# Patient Record
Sex: MALE | Race: OTHER | HISPANIC OR LATINO
[De-identification: names, ages, dates, MRNs, and addresses within clinical notes are randomized per-mention and may not be internally consistent; named-entity substitution may affect disease eponyms.]

---

## 2017-12-08 PROBLEM — Z00.00 ENCOUNTER FOR PREVENTIVE HEALTH EXAMINATION: Status: ACTIVE | Noted: 2017-12-08

## 2017-12-21 ENCOUNTER — APPOINTMENT (OUTPATIENT)
Dept: OPHTHALMOLOGY | Facility: CLINIC | Age: 52
End: 2017-12-21
Payer: MEDICAID

## 2017-12-21 PROCEDURE — 92225: CPT | Mod: LT

## 2017-12-21 PROCEDURE — 92014 COMPRE OPH EXAM EST PT 1/>: CPT

## 2019-01-17 ENCOUNTER — APPOINTMENT (OUTPATIENT)
Dept: OPHTHALMOLOGY | Facility: CLINIC | Age: 54
End: 2019-01-17
Payer: MEDICAID

## 2019-01-17 PROCEDURE — 92014 COMPRE OPH EXAM EST PT 1/>: CPT

## 2019-01-17 PROCEDURE — 92250 FUNDUS PHOTOGRAPHY W/I&R: CPT

## 2019-01-17 PROCEDURE — 92015 DETERMINE REFRACTIVE STATE: CPT

## 2019-03-13 ENCOUNTER — EMERGENCY (EMERGENCY)
Facility: HOSPITAL | Age: 54
LOS: 1 days | Discharge: HOME | End: 2019-03-13
Attending: EMERGENCY MEDICINE | Admitting: EMERGENCY MEDICINE
Payer: MEDICAID

## 2019-03-13 VITALS
RESPIRATION RATE: 20 BRPM | SYSTOLIC BLOOD PRESSURE: 120 MMHG | OXYGEN SATURATION: 98 % | DIASTOLIC BLOOD PRESSURE: 74 MMHG | HEART RATE: 98 BPM

## 2019-03-13 VITALS
SYSTOLIC BLOOD PRESSURE: 149 MMHG | DIASTOLIC BLOOD PRESSURE: 85 MMHG | TEMPERATURE: 99 F | HEART RATE: 113 BPM | RESPIRATION RATE: 20 BRPM | OXYGEN SATURATION: 99 %

## 2019-03-13 LAB
ALBUMIN SERPL ELPH-MCNC: 4.3 G/DL — SIGNIFICANT CHANGE UP (ref 3.3–5)
ALP SERPL-CCNC: 73 U/L — SIGNIFICANT CHANGE UP (ref 40–120)
ALT FLD-CCNC: 32 U/L — SIGNIFICANT CHANGE UP (ref 4–41)
ANION GAP SERPL CALC-SCNC: 15 MMO/L — HIGH (ref 7–14)
APPEARANCE UR: CLEAR — SIGNIFICANT CHANGE UP
AST SERPL-CCNC: 27 U/L — SIGNIFICANT CHANGE UP (ref 4–40)
B-OH-BUTYR SERPL-SCNC: < 0 MMOL/L — LOW (ref 0–0.4)
BACTERIA # UR AUTO: NEGATIVE — SIGNIFICANT CHANGE UP
BASE EXCESS BLDV CALC-SCNC: 0.9 MMOL/L — SIGNIFICANT CHANGE UP
BASE EXCESS BLDV CALC-SCNC: 3.5 MMOL/L — SIGNIFICANT CHANGE UP
BASOPHILS # BLD AUTO: 0.01 K/UL — SIGNIFICANT CHANGE UP (ref 0–0.2)
BASOPHILS NFR BLD AUTO: 0.1 % — SIGNIFICANT CHANGE UP (ref 0–2)
BILIRUB SERPL-MCNC: 0.6 MG/DL — SIGNIFICANT CHANGE UP (ref 0.2–1.2)
BILIRUB UR-MCNC: NEGATIVE — SIGNIFICANT CHANGE UP
BLOOD GAS VENOUS - CREATININE: 0.41 MG/DL — LOW (ref 0.5–1.3)
BLOOD GAS VENOUS - CREATININE: 0.56 MG/DL — SIGNIFICANT CHANGE UP (ref 0.5–1.3)
BLOOD UR QL VISUAL: NEGATIVE — SIGNIFICANT CHANGE UP
BUN SERPL-MCNC: 15 MG/DL — SIGNIFICANT CHANGE UP (ref 7–23)
CALCIUM SERPL-MCNC: 9.5 MG/DL — SIGNIFICANT CHANGE UP (ref 8.4–10.5)
CHLORIDE BLDV-SCNC: 105 MMOL/L — SIGNIFICANT CHANGE UP (ref 96–108)
CHLORIDE BLDV-SCNC: 108 MMOL/L — SIGNIFICANT CHANGE UP (ref 96–108)
CHLORIDE SERPL-SCNC: 98 MMOL/L — SIGNIFICANT CHANGE UP (ref 98–107)
CO2 SERPL-SCNC: 25 MMOL/L — SIGNIFICANT CHANGE UP (ref 22–31)
COLOR SPEC: SIGNIFICANT CHANGE UP
CREAT SERPL-MCNC: 0.63 MG/DL — SIGNIFICANT CHANGE UP (ref 0.5–1.3)
EOSINOPHIL # BLD AUTO: 0.07 K/UL — SIGNIFICANT CHANGE UP (ref 0–0.5)
EOSINOPHIL NFR BLD AUTO: 0.7 % — SIGNIFICANT CHANGE UP (ref 0–6)
GAS PNL BLDV: 133 MMOL/L — LOW (ref 136–146)
GAS PNL BLDV: 133 MMOL/L — LOW (ref 136–146)
GLUCOSE BLDV-MCNC: 256 — HIGH (ref 70–99)
GLUCOSE BLDV-MCNC: 297 — HIGH (ref 70–99)
GLUCOSE SERPL-MCNC: 298 MG/DL — HIGH (ref 70–99)
GLUCOSE UR-MCNC: >1000 — HIGH
HCO3 BLDV-SCNC: 25 MMOL/L — SIGNIFICANT CHANGE UP (ref 20–27)
HCO3 BLDV-SCNC: 27 MMOL/L — SIGNIFICANT CHANGE UP (ref 20–27)
HCT VFR BLD CALC: 39.7 % — SIGNIFICANT CHANGE UP (ref 39–50)
HCT VFR BLDV CALC: 38.7 % — LOW (ref 39–51)
HCT VFR BLDV CALC: 43.6 % — SIGNIFICANT CHANGE UP (ref 39–51)
HGB BLD-MCNC: 13.6 G/DL — SIGNIFICANT CHANGE UP (ref 13–17)
HGB BLDV-MCNC: 12.6 G/DL — LOW (ref 13–17)
HGB BLDV-MCNC: 14.2 G/DL — SIGNIFICANT CHANGE UP (ref 13–17)
HYALINE CASTS # UR AUTO: NEGATIVE — SIGNIFICANT CHANGE UP
IMM GRANULOCYTES NFR BLD AUTO: 0.5 % — SIGNIFICANT CHANGE UP (ref 0–1.5)
KETONES UR-MCNC: NEGATIVE — SIGNIFICANT CHANGE UP
LACTATE BLDV-MCNC: 1.9 MMOL/L — SIGNIFICANT CHANGE UP (ref 0.5–2)
LACTATE BLDV-MCNC: 2.1 MMOL/L — HIGH (ref 0.5–2)
LEUKOCYTE ESTERASE UR-ACNC: NEGATIVE — SIGNIFICANT CHANGE UP
LIDOCAIN IGE QN: 67.1 U/L — HIGH (ref 7–60)
LYMPHOCYTES # BLD AUTO: 0.8 K/UL — LOW (ref 1–3.3)
LYMPHOCYTES # BLD AUTO: 8.3 % — LOW (ref 13–44)
MCHC RBC-ENTMCNC: 27.9 PG — SIGNIFICANT CHANGE UP (ref 27–34)
MCHC RBC-ENTMCNC: 34.3 % — SIGNIFICANT CHANGE UP (ref 32–36)
MCV RBC AUTO: 81.4 FL — SIGNIFICANT CHANGE UP (ref 80–100)
MONOCYTES # BLD AUTO: 0.38 K/UL — SIGNIFICANT CHANGE UP (ref 0–0.9)
MONOCYTES NFR BLD AUTO: 4 % — SIGNIFICANT CHANGE UP (ref 2–14)
NEUTROPHILS # BLD AUTO: 8.28 K/UL — HIGH (ref 1.8–7.4)
NEUTROPHILS NFR BLD AUTO: 86.4 % — HIGH (ref 43–77)
NITRITE UR-MCNC: NEGATIVE — SIGNIFICANT CHANGE UP
NRBC # FLD: 0 K/UL — LOW (ref 25–125)
PCO2 BLDV: 43 MMHG — SIGNIFICANT CHANGE UP (ref 41–51)
PCO2 BLDV: 48 MMHG — SIGNIFICANT CHANGE UP (ref 41–51)
PH BLDV: 7.39 PH — SIGNIFICANT CHANGE UP (ref 7.32–7.43)
PH BLDV: 7.39 PH — SIGNIFICANT CHANGE UP (ref 7.32–7.43)
PH UR: 7 — SIGNIFICANT CHANGE UP (ref 5–8)
PLATELET # BLD AUTO: 215 K/UL — SIGNIFICANT CHANGE UP (ref 150–400)
PMV BLD: 10 FL — SIGNIFICANT CHANGE UP (ref 7–13)
PO2 BLDV: 56 MMHG — HIGH (ref 35–40)
PO2 BLDV: 58 MMHG — HIGH (ref 35–40)
POTASSIUM BLDV-SCNC: 3.6 MMOL/L — SIGNIFICANT CHANGE UP (ref 3.4–4.5)
POTASSIUM BLDV-SCNC: 3.6 MMOL/L — SIGNIFICANT CHANGE UP (ref 3.4–4.5)
POTASSIUM SERPL-MCNC: 3.8 MMOL/L — SIGNIFICANT CHANGE UP (ref 3.5–5.3)
POTASSIUM SERPL-SCNC: 3.8 MMOL/L — SIGNIFICANT CHANGE UP (ref 3.5–5.3)
PROT SERPL-MCNC: 7.4 G/DL — SIGNIFICANT CHANGE UP (ref 6–8.3)
PROT UR-MCNC: 50 — SIGNIFICANT CHANGE UP
RBC # BLD: 4.88 M/UL — SIGNIFICANT CHANGE UP (ref 4.2–5.8)
RBC # FLD: 12.6 % — SIGNIFICANT CHANGE UP (ref 10.3–14.5)
RBC CASTS # UR COMP ASSIST: SIGNIFICANT CHANGE UP (ref 0–?)
SAO2 % BLDV: 87.6 % — HIGH (ref 60–85)
SAO2 % BLDV: 89.3 % — HIGH (ref 60–85)
SODIUM SERPL-SCNC: 138 MMOL/L — SIGNIFICANT CHANGE UP (ref 135–145)
SP GR SPEC: 1.03 — SIGNIFICANT CHANGE UP (ref 1–1.04)
SQUAMOUS # UR AUTO: SIGNIFICANT CHANGE UP
UROBILINOGEN FLD QL: NORMAL — SIGNIFICANT CHANGE UP
WBC # BLD: 9.59 K/UL — SIGNIFICANT CHANGE UP (ref 3.8–10.5)
WBC # FLD AUTO: 9.59 K/UL — SIGNIFICANT CHANGE UP (ref 3.8–10.5)
WBC UR QL: SIGNIFICANT CHANGE UP (ref 0–?)

## 2019-03-13 PROCEDURE — 99284 EMERGENCY DEPT VISIT MOD MDM: CPT | Mod: 25

## 2019-03-13 PROCEDURE — 74177 CT ABD & PELVIS W/CONTRAST: CPT | Mod: 26

## 2019-03-13 RX ORDER — ONDANSETRON 8 MG/1
1 TABLET, FILM COATED ORAL
Qty: 12 | Refills: 0 | OUTPATIENT
Start: 2019-03-13 | End: 2019-03-16

## 2019-03-13 RX ORDER — METOCLOPRAMIDE HCL 10 MG
10 TABLET ORAL ONCE
Qty: 0 | Refills: 0 | Status: COMPLETED | OUTPATIENT
Start: 2019-03-13 | End: 2019-03-13

## 2019-03-13 RX ORDER — ACETAMINOPHEN 500 MG
1000 TABLET ORAL ONCE
Qty: 0 | Refills: 0 | Status: COMPLETED | OUTPATIENT
Start: 2019-03-13 | End: 2019-03-13

## 2019-03-13 RX ORDER — SODIUM CHLORIDE 9 MG/ML
1000 INJECTION INTRAMUSCULAR; INTRAVENOUS; SUBCUTANEOUS ONCE
Qty: 0 | Refills: 0 | Status: COMPLETED | OUTPATIENT
Start: 2019-03-13 | End: 2019-03-13

## 2019-03-13 RX ORDER — FAMOTIDINE 10 MG/ML
20 INJECTION INTRAVENOUS ONCE
Qty: 0 | Refills: 0 | Status: COMPLETED | OUTPATIENT
Start: 2019-03-13 | End: 2019-03-13

## 2019-03-13 RX ORDER — ONDANSETRON 8 MG/1
4 TABLET, FILM COATED ORAL ONCE
Qty: 0 | Refills: 0 | Status: COMPLETED | OUTPATIENT
Start: 2019-03-13 | End: 2019-03-13

## 2019-03-13 RX ADMIN — FAMOTIDINE 20 MILLIGRAM(S): 10 INJECTION INTRAVENOUS at 05:07

## 2019-03-13 RX ADMIN — ONDANSETRON 4 MILLIGRAM(S): 8 TABLET, FILM COATED ORAL at 05:07

## 2019-03-13 RX ADMIN — SODIUM CHLORIDE 1000 MILLILITER(S): 9 INJECTION INTRAMUSCULAR; INTRAVENOUS; SUBCUTANEOUS at 05:08

## 2019-03-13 RX ADMIN — Medication 10 MILLIGRAM(S): at 09:28

## 2019-03-13 RX ADMIN — Medication 30 MILLILITER(S): at 05:07

## 2019-03-13 RX ADMIN — Medication 1000 MILLIGRAM(S): at 09:28

## 2019-03-13 RX ADMIN — Medication 400 MILLIGRAM(S): at 05:07

## 2019-03-13 RX ADMIN — SODIUM CHLORIDE 1000 MILLILITER(S): 9 INJECTION INTRAMUSCULAR; INTRAVENOUS; SUBCUTANEOUS at 05:48

## 2019-03-13 NOTE — ED PROVIDER NOTE - NSFOLLOWUPINSTRUCTIONS_ED_ALL_ED_FT
take zofran as prescribed for nausea.  stay well hydrated.  Follow up with primary doctor within7 days.  Seek medical attention if symptoms worsen or if you cannot drink or eat.

## 2019-03-13 NOTE — ED PROVIDER NOTE - OBJECTIVE STATEMENT
53yo M w/ pmhx of Dm, HLD, HTN presents with worsening non-bloody diarrhea for about 2 weeks and 1 day of NB/NB vomiting. Pt reports 8/10, diffuse abdominal burning pain, similar to his GERD symptoms. Denies any fever or chills, no recent abx use, no recent travel . Denies any other symptoms. PT was vomiting in the bathroom today, while on the ground, slump over the hit his head but no LOC, no blurry vision, nausea, vomiting or any other symptoms. 53yo M w/ pmhx of DM (weekly Trulicity injections due Thursday), HLD, HTN presents with worsening non-bloody diarrhea for about 2 weeks and 1 day of NB/NB vomiting. Pt reports 8/10, diffuse abdominal burning pain, similar to his GERD symptoms. Denies any fever or chills, no recent abx use, no recent travel . Denies any other symptoms. PT was vomiting in the bathroom today, while on the ground, slump over the hit his head but no LOC, no blurry vision, nausea, vomiting or any other symptoms.

## 2019-03-13 NOTE — ED PROVIDER NOTE - CLINICAL SUMMARY MEDICAL DECISION MAKING FREE TEXT BOX
55yo M w/ pmhx of Dm, HLD, HTN presents with worsening non-bloody diarrhea for about 2 weeks and 1 day of NB/NB vomiting. Pt reports 8/10, diffuse abdominal burning pain, similar to his GERD symptoms. Denies any fever or chills, no recent abx use, no recent travel . Denies any other symptoms. PT was vomiting in the bathroom today, while on the ground, slump over the hit his head but no LOC, no blurry vision, nausea, vomiting or any other symptoms.   - Labs, meds, CT

## 2019-03-13 NOTE — ED ADULT NURSE REASSESSMENT NOTE - NS ED NURSE REASSESS COMMENT FT1
pt remains a&ox4. pt has not had v/d since reglan administration. pt was just given crackers,water for PO challenge

## 2019-03-13 NOTE — ED ADULT NURSE NOTE - NSIMPLEMENTINTERV_GEN_ALL_ED
Implemented All Universal Safety Interventions:  [place] to call system. Call bell, personal items and telephone within reach. Instruct patient to call for assistance. Room bathroom lighting operational. Non-slip footwear when patient is off stretcher. Physically safe environment: no spills, clutter or unnecessary equipment. Stretcher in lowest position, wheels locked, appropriate side rails in place.

## 2019-03-13 NOTE — ED ADULT TRIAGE NOTE - CHIEF COMPLAINT QUOTE
pt c/o abdominal pain and diarrhea x 2 weeks worsening today. began vomitting today. states he was weak and fell in the shower, hitting his head, denies loc. no blood thinner use known. abdomen tender to palpation. no obvious s.s of injury

## 2019-03-13 NOTE — ED ADULT NURSE NOTE - OBJECTIVE STATEMENT
received pt to room 2. pt is alert and oriented x4, ambulatory. c/o abdominal pain for two weeks but worse today, nausea and vomiting. vomited several times. labs sent. 20 G SL placed in left AC. IV fluids and meds given as per order. vs WNL received pt to room 2. pt is alert and oriented x4, ambulatory. c/o abdominal pain for two weeks but worse today, nausea and vomiting and diarrhea . vomited several times. labs sent. 20 G SL placed in left AC. IV fluids and meds given as per order. vs WNL

## 2019-03-13 NOTE — ED ADULT NURSE REASSESSMENT NOTE - NS ED NURSE REASSESS COMMENT FT1
assumed care of pt from AGUSTIN Gleason. Pt a&ox4 and ambulatory. pt c/o generalized abdominal pain 6/10. BS present, no increased pain upon palpation. lung sounds clear. Pt currently experiencing n/v/d. VS as noted. repeat lactate normal.  call bell at bedside.

## 2019-03-13 NOTE — ED PROVIDER NOTE - ATTENDING CONTRIBUTION TO CARE
Afebrile. Awake and Alert. Lungs CTA. Heart RRR. Abdomen soft NTND. CN II-XII grossly intact. Moves all extremities without lateralization.    NB diarrhea x3 weeks: Check electroyltes, serial abdominal exams, IVF Afebrile. Awake and Alert. Lungs CTA. Heart RRR. Abdomen soft NTND. CN II-XII grossly intact. Moves all extremities without lateralization.    NB diarrhea x3 weeks: r/o colitis, check electrolytes, IVF, anti-emetic  r/o DKA: labs

## 2019-03-13 NOTE — ED PROVIDER NOTE - PROGRESS NOTE DETAILS
No acute process on CT. Pt asleep. Will repeat VBG and PO challenge. NAOMI Examined patient with family at bedside.  Abdomen is soft, non distended, non tender, no rebound.  He appears well hydrated. he reports vomiting x 1 about 10 minutes ago.  reglan ordered. Patient feeling better with no further episodes of vomiting.  tolerating PO with no abdominal tenderness

## 2020-10-21 ENCOUNTER — EMERGENCY (EMERGENCY)
Facility: HOSPITAL | Age: 55
LOS: 1 days | Discharge: HOME | End: 2020-10-21
Attending: EMERGENCY MEDICINE | Admitting: EMERGENCY MEDICINE
Payer: MEDICAID

## 2020-10-21 VITALS
HEART RATE: 85 BPM | DIASTOLIC BLOOD PRESSURE: 88 MMHG | RESPIRATION RATE: 18 BRPM | OXYGEN SATURATION: 100 % | SYSTOLIC BLOOD PRESSURE: 146 MMHG

## 2020-10-21 VITALS
OXYGEN SATURATION: 99 % | DIASTOLIC BLOOD PRESSURE: 89 MMHG | RESPIRATION RATE: 18 BRPM | HEART RATE: 103 BPM | SYSTOLIC BLOOD PRESSURE: 155 MMHG | TEMPERATURE: 98 F

## 2020-10-21 PROCEDURE — 99283 EMERGENCY DEPT VISIT LOW MDM: CPT

## 2020-10-21 RX ORDER — DIAZEPAM 5 MG
5 TABLET ORAL ONCE
Refills: 0 | Status: DISCONTINUED | OUTPATIENT
Start: 2020-10-21 | End: 2020-10-21

## 2020-10-21 RX ORDER — LIDOCAINE 4 G/100G
1 CREAM TOPICAL ONCE
Refills: 0 | Status: COMPLETED | OUTPATIENT
Start: 2020-10-21 | End: 2020-10-21

## 2020-10-21 RX ORDER — KETOROLAC TROMETHAMINE 30 MG/ML
30 SYRINGE (ML) INJECTION ONCE
Refills: 0 | Status: DISCONTINUED | OUTPATIENT
Start: 2020-10-21 | End: 2020-10-21

## 2020-10-21 RX ORDER — LIDOCAINE 4 G/100G
1 CREAM TOPICAL
Qty: 12 | Refills: 0
Start: 2020-10-21

## 2020-10-21 RX ORDER — DIAZEPAM 5 MG
1 TABLET ORAL
Qty: 15 | Refills: 0
Start: 2020-10-21

## 2020-10-21 RX ORDER — OXYCODONE AND ACETAMINOPHEN 5; 325 MG/1; MG/1
1 TABLET ORAL ONCE
Refills: 0 | Status: DISCONTINUED | OUTPATIENT
Start: 2020-10-21 | End: 2020-10-21

## 2020-10-21 RX ORDER — IBUPROFEN 200 MG
1 TABLET ORAL
Qty: 20 | Refills: 0
Start: 2020-10-21

## 2020-10-21 RX ADMIN — OXYCODONE AND ACETAMINOPHEN 1 TABLET(S): 5; 325 TABLET ORAL at 22:16

## 2020-10-21 RX ADMIN — LIDOCAINE 1 PATCH: 4 CREAM TOPICAL at 20:56

## 2020-10-21 RX ADMIN — Medication 30 MILLIGRAM(S): at 20:56

## 2020-10-21 RX ADMIN — Medication 30 MILLIGRAM(S): at 21:12

## 2020-10-21 RX ADMIN — Medication 5 MILLIGRAM(S): at 20:56

## 2020-10-21 NOTE — ED ADULT NURSE NOTE - OBJECTIVE STATEMENT
56y/o male aaox4 and ambulatory at baseline c/o back pain. Pt states pain started yesterday while laying in bed. Pt states pain radiates to b/c legs; denies numbness/tingling. Pt denies injury, chest pain, SOB, dizziness, N+V. Pt states pain worsens with movement and activity. Vital signs as noted. PT medicated as per MD order. Pt repositioned for comfort. Will continue to monitor.

## 2020-10-21 NOTE — ED PROVIDER NOTE - OBJECTIVE STATEMENT
Patient is a 54 y/o M with hx of DM presenting with lower back pain x 1 day. pain is sharp in nature located to the lumbosacral region that radiates down the legs. he denies recent injury/trauma/strenous activity prior to the onset of pain. There is no associated urinary/fecal incontinence, numbness, weakness, saddle anesthesia, loss of lower extremity function.

## 2020-10-21 NOTE — ED PROVIDER NOTE - MUSCULOSKELETAL, MLM
+ tenderness to the lumbosacral region. no midline spinal tenderess, no obvious signs of trauma or rash. good lower extremity strength, (-) straight leg raise. no numbness, weakness, no saddle anesthesia.

## 2020-10-21 NOTE — ED PROVIDER NOTE - ATTENDING CONTRIBUTION TO CARE
Attending note:   After face to face evaluation of this patient, I concur with above noted hx, pe, and care plan for this patient.  [place]: 55 yom with DM complaining of one day of lower back pain very sharp and radiating down both legs. Pt denies trauma, numbness, tingling, difficulty with bowel or bladder function. on exam, pt is well appearing, ambulating well without difficulty, no midline spinal tn, significant paraspinal tenderness in the lower lumbar areas bilaterally left more than right side. negative straight leg raise bilaterally, no saddle anesthesia, abd soft and non tender, clear lungs and RRR. Pt's pain appears to be radicular in nature and MSK related, no  or GI. Will treat pain with NSAIDs, lido patch, muscle relaxants and reassess.

## 2020-10-21 NOTE — ED PROVIDER NOTE - CLINICAL SUMMARY MEDICAL DECISION MAKING FREE TEXT BOX
patient presenting with lower back pain x 1 day. no neurological deficits. plan for trial of toradol, valium and lidocaine patch and reassessment

## 2020-10-21 NOTE — ED ADULT TRIAGE NOTE - CHIEF COMPLAINT QUOTE
Patient c/o lower back pain that goes to bilateral legs, pain started yesterday. Patient appears uncomfortable in triage.

## 2020-10-21 NOTE — ED PROVIDER NOTE - PATIENT PORTAL LINK FT
You can access the FollowMyHealth Patient Portal offered by [facility] by registering at the following website: [email or web address]. By joining [employer]’s FollowMyHealth portal, you will also be able to view your health information using other applications (apps) compatible with our system.

## 2020-10-22 PROBLEM — E11.9 TYPE 2 DIABETES MELLITUS WITHOUT COMPLICATIONS: Chronic | Status: ACTIVE | Noted: 2019-03-13

## 2020-10-22 PROBLEM — I10 ESSENTIAL (PRIMARY) HYPERTENSION: Chronic | Status: ACTIVE | Noted: 2019-03-13

## 2021-01-31 ENCOUNTER — EMERGENCY (EMERGENCY)
Facility: HOSPITAL | Age: 56
LOS: 1 days | Discharge: HOME | End: 2021-01-31
Attending: EMERGENCY MEDICINE | Admitting: EMERGENCY MEDICINE
Payer: MEDICAID

## 2021-01-31 VITALS
SYSTOLIC BLOOD PRESSURE: 146 MMHG | HEART RATE: 95 BPM | DIASTOLIC BLOOD PRESSURE: 97 MMHG | RESPIRATION RATE: 18 BRPM | TEMPERATURE: 98 F | OXYGEN SATURATION: 99 %

## 2021-01-31 VITALS
OXYGEN SATURATION: 100 % | RESPIRATION RATE: 18 BRPM | SYSTOLIC BLOOD PRESSURE: 115 MMHG | DIASTOLIC BLOOD PRESSURE: 78 MMHG | HEART RATE: 78 BPM

## 2021-01-31 LAB
ALBUMIN SERPL ELPH-MCNC: 4 G/DL — SIGNIFICANT CHANGE UP (ref 3.3–5)
ALP SERPL-CCNC: 95 U/L — SIGNIFICANT CHANGE UP (ref 40–120)
ALT FLD-CCNC: 17 U/L — SIGNIFICANT CHANGE UP (ref 4–41)
ANION GAP SERPL CALC-SCNC: 11 MMOL/L — SIGNIFICANT CHANGE UP (ref 7–14)
AST SERPL-CCNC: 16 U/L — SIGNIFICANT CHANGE UP (ref 4–40)
BASOPHILS # BLD AUTO: 0.02 K/UL — SIGNIFICANT CHANGE UP (ref 0–0.2)
BASOPHILS NFR BLD AUTO: 0.3 % — SIGNIFICANT CHANGE UP (ref 0–2)
BILIRUB SERPL-MCNC: 0.7 MG/DL — SIGNIFICANT CHANGE UP (ref 0.2–1.2)
BUN SERPL-MCNC: 11 MG/DL — SIGNIFICANT CHANGE UP (ref 7–23)
CALCIUM SERPL-MCNC: 9.6 MG/DL — SIGNIFICANT CHANGE UP (ref 8.4–10.5)
CHLORIDE SERPL-SCNC: 100 MMOL/L — SIGNIFICANT CHANGE UP (ref 98–107)
CO2 SERPL-SCNC: 24 MMOL/L — SIGNIFICANT CHANGE UP (ref 22–31)
CREAT SERPL-MCNC: 0.49 MG/DL — LOW (ref 0.5–1.3)
EOSINOPHIL # BLD AUTO: 0.08 K/UL — SIGNIFICANT CHANGE UP (ref 0–0.5)
EOSINOPHIL NFR BLD AUTO: 1.3 % — SIGNIFICANT CHANGE UP (ref 0–6)
GLUCOSE SERPL-MCNC: 295 MG/DL — HIGH (ref 70–99)
HCT VFR BLD CALC: 45 % — SIGNIFICANT CHANGE UP (ref 39–50)
HGB BLD-MCNC: 14.9 G/DL — SIGNIFICANT CHANGE UP (ref 13–17)
IANC: 4.62 K/UL — SIGNIFICANT CHANGE UP (ref 1.5–8.5)
IMM GRANULOCYTES NFR BLD AUTO: 0.6 % — SIGNIFICANT CHANGE UP (ref 0–1.5)
LYMPHOCYTES # BLD AUTO: 1.23 K/UL — SIGNIFICANT CHANGE UP (ref 1–3.3)
LYMPHOCYTES # BLD AUTO: 19.7 % — SIGNIFICANT CHANGE UP (ref 13–44)
MCHC RBC-ENTMCNC: 26.9 PG — LOW (ref 27–34)
MCHC RBC-ENTMCNC: 33.1 GM/DL — SIGNIFICANT CHANGE UP (ref 32–36)
MCV RBC AUTO: 81.2 FL — SIGNIFICANT CHANGE UP (ref 80–100)
MONOCYTES # BLD AUTO: 0.25 K/UL — SIGNIFICANT CHANGE UP (ref 0–0.9)
MONOCYTES NFR BLD AUTO: 4 % — SIGNIFICANT CHANGE UP (ref 2–14)
NEUTROPHILS # BLD AUTO: 4.62 K/UL — SIGNIFICANT CHANGE UP (ref 1.8–7.4)
NEUTROPHILS NFR BLD AUTO: 74.1 % — SIGNIFICANT CHANGE UP (ref 43–77)
NRBC # BLD: 0 /100 WBCS — SIGNIFICANT CHANGE UP
NRBC # FLD: 0 K/UL — SIGNIFICANT CHANGE UP
PLATELET # BLD AUTO: 190 K/UL — SIGNIFICANT CHANGE UP (ref 150–400)
POTASSIUM SERPL-MCNC: 4.2 MMOL/L — SIGNIFICANT CHANGE UP (ref 3.5–5.3)
POTASSIUM SERPL-SCNC: 4.2 MMOL/L — SIGNIFICANT CHANGE UP (ref 3.5–5.3)
PROT SERPL-MCNC: 7.6 G/DL — SIGNIFICANT CHANGE UP (ref 6–8.3)
RBC # BLD: 5.54 M/UL — SIGNIFICANT CHANGE UP (ref 4.2–5.8)
RBC # FLD: 12.8 % — SIGNIFICANT CHANGE UP (ref 10.3–14.5)
SARS-COV-2 RNA SPEC QL NAA+PROBE: SIGNIFICANT CHANGE UP
SODIUM SERPL-SCNC: 135 MMOL/L — SIGNIFICANT CHANGE UP (ref 135–145)
TROPONIN T, HIGH SENSITIVITY RESULT: 7 NG/L — SIGNIFICANT CHANGE UP
TSH SERPL-MCNC: 1.38 UIU/ML — SIGNIFICANT CHANGE UP (ref 0.27–4.2)
WBC # BLD: 6.24 K/UL — SIGNIFICANT CHANGE UP (ref 3.8–10.5)
WBC # FLD AUTO: 6.24 K/UL — SIGNIFICANT CHANGE UP (ref 3.8–10.5)

## 2021-01-31 PROCEDURE — 0042T: CPT

## 2021-01-31 PROCEDURE — 99284 EMERGENCY DEPT VISIT MOD MDM: CPT

## 2021-01-31 PROCEDURE — 70450 CT HEAD/BRAIN W/O DYE: CPT | Mod: 26,59

## 2021-01-31 PROCEDURE — 71045 X-RAY EXAM CHEST 1 VIEW: CPT | Mod: 26

## 2021-01-31 PROCEDURE — 70496 CT ANGIOGRAPHY HEAD: CPT | Mod: 26

## 2021-01-31 PROCEDURE — 72125 CT NECK SPINE W/O DYE: CPT | Mod: 26

## 2021-01-31 PROCEDURE — 70498 CT ANGIOGRAPHY NECK: CPT | Mod: 26

## 2021-01-31 RX ORDER — MECLIZINE HCL 12.5 MG
1 TABLET ORAL
Qty: 15 | Refills: 0
Start: 2021-01-31 | End: 2021-02-04

## 2021-01-31 RX ORDER — MECLIZINE HCL 12.5 MG
25 TABLET ORAL ONCE
Refills: 0 | Status: COMPLETED | OUTPATIENT
Start: 2021-01-31 | End: 2021-01-31

## 2021-01-31 RX ORDER — SODIUM CHLORIDE 9 MG/ML
1000 INJECTION INTRAMUSCULAR; INTRAVENOUS; SUBCUTANEOUS ONCE
Refills: 0 | Status: COMPLETED | OUTPATIENT
Start: 2021-01-31 | End: 2021-01-31

## 2021-01-31 RX ORDER — ACETAMINOPHEN 500 MG
975 TABLET ORAL ONCE
Refills: 0 | Status: COMPLETED | OUTPATIENT
Start: 2021-01-31 | End: 2021-01-31

## 2021-01-31 RX ADMIN — Medication 25 MILLIGRAM(S): at 11:44

## 2021-01-31 RX ADMIN — Medication 975 MILLIGRAM(S): at 11:44

## 2021-01-31 RX ADMIN — SODIUM CHLORIDE 1000 MILLILITER(S): 9 INJECTION INTRAMUSCULAR; INTRAVENOUS; SUBCUTANEOUS at 11:44

## 2021-01-31 NOTE — ED PROVIDER NOTE - ATTENDING CONTRIBUTION TO CARE
DR. BLOCH, ATTENDING MD-  I performed a face to face bedside interview with patient regarding history of present illness, review of symptoms and past medical history. I completed an independent physical exam.  I have discussed patient's plan of care with the resident.  patient alert and oriented, HEENT nml heart s1s2, lungs clear, abd soft nontender, motor 5/5dtr 3+ and equal, no clonus, sensation intact. pos nystagmus, both directions, no past pointing. gait abn, falls to left, pos rhomberg

## 2021-01-31 NOTE — CONSULT NOTE ADULT - SUBJECTIVE AND OBJECTIVE BOX
Neurology  Consult Note  01-31-21    Name:  JESSICA PEÑALOZA; 55y (1965)      Neurology consult: 56yo Albanian-speaking man with PMH of DM who presented to the ED with dizziness and syncope since this morning. LKN was 0830h. Patient reports he was having an argument with his wife and then does not recall the events well began he had LOC for possibly 1-3 minutes. As per EMR, family noticed he was beginning to fall and grabbed him preventing head trauma. Patient reports generalized weakness, pain around his eyes, and room-spinning sensation. Patient denies focal weakness, numbness, nausea, vomiting, acute changes in vision or hearing, recent falls or trauma. In the ED code stroke was activated. NIHSS of 0. Pre-MRS of 0. CTH shows no acute pathology. CTA shows no LVO but notes RUL groundglass opacities. CTP shows no core infarct or penumbra. Patient was not a candidate for tPA due to low NIHSS score and was not a candidate for thrombectomy due to no LVO.    Review of Systems:  As states in HPI.        PMHx:   HTN (hypertension)    DM (diabetes mellitus)      PFHx:   No pertinent family history in first degree relatives    No pertinent family history in first degree relatives      PSuHx:   No significant past surgical history        Vitals:  T(C): 36.7 (01-31-21 @ 10:15), Max: 36.7 (01-31-21 @ 10:15)  HR: 90 (01-31-21 @ 11:12) (90 - 95)  BP: 140/93 (01-31-21 @ 11:12) (140/93 - 146/97)  RR: 18 (01-31-21 @ 11:12) (18 - 18)  SpO2: 100% (01-31-21 @ 11:12) (99% - 100%)    Physical Examination:  Neurologic:  - Mental Status: Alert, awake, oriented to person, place, and time; Speech is fluent with intact naming, repetition, and comprehension, no dysarthria; Follows all commands.  - Cranial Nerves:  II: Visual fields are full to confrontation; Pupils are equal, round, and reactive to light;  III, IV, VI: Extraocular movements are intact with R beating nystagmus. HINTS exam unremarkable.  V: Facial sensation is intact in the V1-V3 distribution bilaterally.  VII: Face is symmetric with normal eye closure and smile.  VIII: Hearing is intact to finger rub.  IX, X: Uvula is midline and soft palate rises symmetrically.  XI: Head turning and shoulder shrug are intact.  XII: Tongue protrudes in the midline.  - Motor: Strength is 5/5 throughout on full effort, occasionally limited by pain particularly behind the L > R patella. There is no pronator drift.  - Reflexes: 3+ and symmetric at the biceps, triceps, knees.  - Sensory: Intact throughout to light touch  - Coordination: Finger-nose-finger and heel-knee-shin intact without dysmetria.  - Gait: Unsteady gait and turning. Romberg testing is positive however patient falls in no particular direction as he was initially falling to the L but then began falling forward, backward, and to the R.    Labs:                        14.9   6.24  )-----------( 190      ( 31 Jan 2021 11:21 )             45.0     01-31    135  |  100  |  11  ----------------------------<  295<H>  4.2   |  24  |  0.49<L>    Ca    9.6      31 Jan 2021 11:21    TPro  7.6  /  Alb  4.0  /  TBili  0.7  /  DBili  x   /  AST  16  /  ALT  17  /  AlkPhos  95  01-31    CAPILLARY BLOOD GLUCOSE        LIVER FUNCTIONS - ( 31 Jan 2021 11:21 )  Alb: 4.0 g/dL / Pro: 7.6 g/dL / ALK PHOS: 95 U/L / ALT: 17 U/L / AST: 16 U/L / GGT: x                     Radiology:  CT Brain Stroke Protocol (01.31.21 @ 11:26)  IMPRESSION:  No acute intracranial hemorrhage, or CT evidence of acute vascular territorial infarct.    CT Angio Neck w/ IV Cont (01.31.21 @ 11:37)  IMPRESSION:  CT angiography neck: No evidence of hemodynamically significant stenosis by NASCET criteria. No evidence of vascular dissection. Limited evaluation of the lung parenchyma secondary to respiratory motion artifact, however there is partially imaged right upper lobe groundglass opacities.  CT angiography brain: No major vessel occlusion or proximal stenosis by NASCET criteria. No evidence of aneurysm or other vascular malformation.  CT PERFUSION: No perfusion defect.   Neurology  Consult Note  01-31-21    Name:  JESSICA PEÑALOZA; 55y (1965)      Neurology consult: 56yo Wolof-speaking man with PMH of DM who presented to the ED with dizziness and syncope since this morning. LKN was 0830h. Patient reports he was having an argument with his wife when he had LOC for possibly 1-3 minutes. As per EMR, family noticed he was beginning to fall and grabbed him preventing head trauma. Patient reports generalized weakness, pain around his eyes, and room-spinning sensation. Patient denies focal weakness, numbness, nausea, vomiting, acute changes in vision or hearing, recent falls or trauma. In the ED code stroke was activated. NIHSS of 0. Pre-MRS of 0. CTH shows no acute pathology. CTA shows no LVO but notes RUL groundglass opacities. CTP shows no core infarct or penumbra. Patient was not a candidate for tPA due to low NIHSS score and was not a candidate for thrombectomy due to no LVO.    Review of Systems:  As states in HPI.        PMHx:   HTN (hypertension)    DM (diabetes mellitus)      PFHx:   No pertinent family history in first degree relatives    No pertinent family history in first degree relatives      PSuHx:   No significant past surgical history        Vitals:  T(C): 36.7 (01-31-21 @ 10:15), Max: 36.7 (01-31-21 @ 10:15)  HR: 90 (01-31-21 @ 11:12) (90 - 95)  BP: 140/93 (01-31-21 @ 11:12) (140/93 - 146/97)  RR: 18 (01-31-21 @ 11:12) (18 - 18)  SpO2: 100% (01-31-21 @ 11:12) (99% - 100%)    Physical Examination:  Neurologic:  - Mental Status: Alert, awake, oriented to person, place, and time; Speech is fluent with intact naming, repetition, and comprehension, no dysarthria; Follows all commands.  - Cranial Nerves:  II: Visual fields are full to confrontation; Pupils are equal, round, and reactive to light;  III, IV, VI: Extraocular movements are intact with R beating nystagmus. HINTS exam unremarkable.  V: Facial sensation is intact in the V1-V3 distribution bilaterally.  VII: Face is symmetric with normal eye closure and smile.  VIII: Hearing is intact to finger rub.  IX, X: Uvula is midline and soft palate rises symmetrically.  XI: Head turning and shoulder shrug are intact.  XII: Tongue protrudes in the midline.  - Motor: Strength is 5/5 throughout on full effort, occasionally limited by pain particularly behind the L > R patella. There is no pronator drift.  - Reflexes: 3+ and symmetric at the biceps, triceps, knees.  - Sensory: Intact throughout to light touch  - Coordination: Finger-nose-finger and heel-knee-shin intact without dysmetria.  - Gait: Unsteady gait and turning. Romberg testing is positive however patient falls in no particular direction as he was initially falling to the L but then began falling forward, backward, and to the R.    Labs:                        14.9   6.24  )-----------( 190      ( 31 Jan 2021 11:21 )             45.0     01-31    135  |  100  |  11  ----------------------------<  295<H>  4.2   |  24  |  0.49<L>    Ca    9.6      31 Jan 2021 11:21    TPro  7.6  /  Alb  4.0  /  TBili  0.7  /  DBili  x   /  AST  16  /  ALT  17  /  AlkPhos  95  01-31    CAPILLARY BLOOD GLUCOSE        LIVER FUNCTIONS - ( 31 Jan 2021 11:21 )  Alb: 4.0 g/dL / Pro: 7.6 g/dL / ALK PHOS: 95 U/L / ALT: 17 U/L / AST: 16 U/L / GGT: x                     Radiology:  CT Brain Stroke Protocol (01.31.21 @ 11:26)  IMPRESSION:  No acute intracranial hemorrhage, or CT evidence of acute vascular territorial infarct.    CT Angio Neck w/ IV Cont (01.31.21 @ 11:37)  IMPRESSION:  CT angiography neck: No evidence of hemodynamically significant stenosis by NASCET criteria. No evidence of vascular dissection. Limited evaluation of the lung parenchyma secondary to respiratory motion artifact, however there is partially imaged right upper lobe groundglass opacities.  CT angiography brain: No major vessel occlusion or proximal stenosis by NASCET criteria. No evidence of aneurysm or other vascular malformation.  CT PERFUSION: No perfusion defect.

## 2021-01-31 NOTE — ED ADULT TRIAGE NOTE - CHIEF COMPLAINT QUOTE
Patient brought to ER from home by EMS for c/o dizziness after he got into an argument with his family. . Patient brought to ER from home by EMS for c/o dizziness after he got into an argument with his family. . Pt has a 20 gauge IV lock to left antecubital with normal saline infusing.

## 2021-01-31 NOTE — ED PROVIDER NOTE - NSFOLLOWUPCLINICS_GEN_ALL_ED_FT
[facility] - Primary Care  Primary Care  [address] [place] [address]  Phone: [phone]  Fax:   Follow Up Time: Routine

## 2021-01-31 NOTE — ED ADULT NURSE NOTE - OBJECTIVE STATEMENT
BIBEMS a/ox4, ambulatory at baseline, c/o dizziness and LOC at 0830 AM and cp. Pt states he was arguing with his wife when the cp started and then he had LOC, denies head trauma, states family caught him before he fell. pt endorsing dizziness, states the room feels like it is spinning.   Denies cough, fever, n/v/d cough, abd pain. Pt compliant with home medications. 20g IV LAC placed by EMS patent and no pain at site, connected to cardiac monitor, NSR, speaking in full sentences, breathing unlabored, will continue to assess.

## 2021-01-31 NOTE — ED PROVIDER NOTE - NSFOLLOWUPINSTRUCTIONS_ED_ALL_ED_FT
You were seen in the emergency department for dizziness. Your symptoms are most consistent with vertigo.     (1) Follow up with your primary care physician as discussed. In addition, we did not find evidence of a life threatening illness on your testing here today, but listed below are the specialists that will be necessary to see as an outpatient to continue the workup.  Please call the numbers listed below or [phone] to set up the necessary appointments.  (2) Immediately seek care at your nearest emergency room if your worsen, persist, or do not resolve   (3) Take the prescribed medication as instructed:  - Meclizine one tablet **only as needed** for the dizziness/vertigo You were seen in the emergency department for dizziness. Your symptoms are most consistent with vertigo.     (1) Follow up with your primary care physician as discussed. In addition, we did not find evidence of a life threatening illness on your testing here today, but listed below are the specialists that will be necessary to see as an outpatient to continue the workup.  Please call the numbers listed below or [phone] to set up the necessary appointments.  (2) Immediately seek care at your nearest emergency room if your worsen, persist, or do not resolve   (3) Take the prescribed medication as instructed:  - Meclizine one tablet **only as needed** for the dizziness/vertigo    (4) The CT scan of your neck showed ground glass opacity on the right upper lobe of your lungs. Please follow up with your primary care doctor regarding this finding. You were seen in the emergency department for dizziness. Your symptoms are most consistent with vertigo.     (1) Follow up with your primary care physician as discussed. In addition, we did not find evidence of a life threatening illness on your testing here today, but listed below are the specialists that will be necessary to see as an outpatient to continue the workup.  Please call the numbers listed below or [phone] to set up the necessary appointments. We have included a referral to a primary care doctor if you do not have one above.   (2) Immediately seek care at your nearest emergency room if your worsen, persist, or do not resolve   (3) Take the prescribed medication as instructed:  - Meclizine one tablet **only as needed** for the dizziness/vertigo    (4) The CT scan of your neck showed ground glass opacity on the right upper lobe of your lungs. Please follow up with your primary care doctor regarding this finding.    (5) Your hemoglobin A1C (marker of your diabetes) is 11%-- this is really high. Please follow up with your PCP regarding this.

## 2021-01-31 NOTE — CONSULT NOTE ADULT - ASSESSMENT
54yo Slovak-speaking man with PMH of DM who presented to the ED with dizziness and syncope since this morning. LKN was 0830h.  Patient reports generalized weakness, pain around his eyes, and room-spinning sensation. Patient denies focal weakness, numbness, nausea, vomiting, acute changes in vision or hearing, recent falls or trauma. In the ED code stroke was activated. NIHSS of 0. Pre-MRS of 0. Physical exam was positive for R-beating nystagmus and gait instability with falls in all directions, with negative HINTS exam and no other cerebellar signs. CTH shows no acute pathology. CTA shows no LVO but notes RUL groundglass opacities. CTP shows no core infarct or penumbra. Patient was not a candidate for tPA due to low NIHSS score and was not a candidate for thrombectomy due to no LVO.    Impression: Dizziness with unsteady gait s/p syncope possibly due to peripheral etiology such as BPPV vs. lower suspicion for central etiology such as ischemic CVA of the posterior circulation vs. r/o cardiac etiology such as arrhythmia vs. fluctuations of blood glucose in the setting of hyperglycemia vs. r/o COVID-19.    Recommendations:  [] Symptomatic management with meclizine as per ED  [] Continue ASA 81mg PO daily  [] Telemetry monitoring  [] BG goal   [] Syncope and cardiac workup as per ED  [] COVID-19 testing  [] If no improvement with symptomatic management can consider MRI brain w/o contrast for further evaluation  [] Fall precautions  [] Follow-up with Neurology clinic at [address] [place] [address] ([phone])    Case and disposition discussed with Telestroke attending Dr. Giron.  Case to be seen and discussed with neurology attending Dr. Linn. 54yo Sinhala-speaking man with PMH of DM who presented to the ED with dizziness and syncope since this morning. LKN was 0830h.  Patient reports generalized weakness, pain around his eyes, and room-spinning sensation. Patient denies focal weakness, numbness, nausea, vomiting, acute changes in vision or hearing, recent falls or trauma. In the ED code stroke was activated. NIHSS of 0. Pre-MRS of 0. Physical exam was positive for R-beating nystagmus and gait instability with falls in all directions, with negative HINTS exam and no other cerebellar signs. CTH shows no acute pathology. CTA shows no LVO but notes RUL groundglass opacities. CTP shows no core infarct or penumbra. Patient was not a candidate for tPA due to low NIHSS score and was not a candidate for thrombectomy due to no LVO.    Impression: Dizziness with unsteady gait s/p syncope possibly due to peripheral etiology such as BPPV vs. lower suspicion for central etiology such as ischemic CVA of the posterior circulation vs. r/o cardiac etiology such as arrhythmia vs. fluctuations of blood glucose in the setting of hyperglycemia vs. r/o COVID-19.    Recommendations:  [] Symptomatic management with meclizine as per ED  [] Continue ASA 81mg PO daily  [] Telemetry monitoring  [] BG goal   [] Syncope and cardiac workup as per ED  [] COVID-19 testing  [] Vestibular therapy  [] If no improvement with symptomatic management can consider MRI brain w/o contrast for further evaluation  [] Fall precautions  [] Follow-up with Neurology clinic at [address] [place] [address] ([phone])    Case and disposition discussed with Telestroke attending Dr. Giron.  Case to be seen and discussed with neurology attending Dr. Linn.

## 2021-01-31 NOTE — ED ADULT NURSE NOTE - CHIEF COMPLAINT QUOTE
Patient brought to ER from home by EMS for c/o dizziness after he got into an argument with his family. . Pt has a 20 gauge IV lock to left antecubital with normal saline infusing.

## 2021-01-31 NOTE — STROKE CODE NOTE - NIH STROKE SCALE: 1B. LOC QUESTIONS, QM
(0) Answers both questions correctly [Skin Rash] : skin rash [Negative] : Heme/Lymph [de-identified] : HPI [de-identified] : HPI

## 2021-01-31 NOTE — ED PROVIDER NOTE - PHYSICAL EXAMINATION
CONSTITUTIONAL: NAD. Awake and conversive, cooperative with exam  EYES: EOMI, conjunctiva and sclera clear  ENMT: MMM   NECK: Supple   RESPIRATORY: Normal respiratory effort, CTAB, no wheezes, rales, or rhonchi  CARDIOVASCULAR: RRR, normal S1 and S2, no MRG, distal pulses 2+ bilaterally, capillary refill < 2 seconds, no peripheral edema  ABDOMEN: Soft, non-distended, no TTP, no rebound/guarding  MUSCULOSKELETAL: No joint swelling or tenderness  NEURO: AO to person, place, and time; following commands, moving all extremities spontaneously, strength 5/5 throughout all extremities, sensation intact, Romberg positive, horizontal nystagmus, EOM otherwise intact, PERRL, gait-falls on his left side   PSYCH: appropriate affect   SKIN: No rashes; no palpable lesions

## 2021-01-31 NOTE — ED PROVIDER NOTE - OBJECTIVE STATEMENT
56yo PMHx DM on "a lot of medications" (not insulin) presenting after episode of LOC. Arguing with wife at home, felt chest pain, episode of LOC lasting 1-3 minutes. Family grabbed patient as they noticed that he was going to faint, patient did not hit his head or any other part of his body. Reports someone checked his blood sugar, it was elevated. Says foam was coming out of his mouth. Endorses chills secondary to cold weather. No night sweats, fevers, vomiting, diarrhea or headache. Endorses headache localized to the entire head, worse when he bends down. No rash or neck stiffness. Endorsing photophobia. No nausea or vomiting or abdominal pain. Current dizziness and feeling like the room is spinning--has not had this before. Dizziness exacerbated by standing up. Did not eat anything this morning. Last ate yesterday at 3pm, some coffee. Has been taking all of his medications.     Similar episode 6 months ago--but also had vomiting, diarrhea and headache at that time. 54yo PMHx DM on metformin and glipizine (not on insulin) presenting after episode of LOC and chest pain. Arguing with wife at home, felt nonradiating anterior chest pain, episode of LOC lasting 1-3 minutes. Family grabbed patient as they noticed that he was going to faint, patient did not hit his head or any other part of his body. Reports someone checked his blood sugar, it was elevated. Says foam was coming out of his mouth. Endorses chills secondary to cold weather. No night sweats, fevers, vomiting, diarrhea or headache. Endorses headache localized to the entire head, worse when he bends down. No rash or neck stiffness. Endorsing photophobia. No nausea or vomiting or abdominal pain. Current dizziness and feeling like the room is spinning--has not had this before. Dizziness exacerbated by standing up. Did not eat anything this morning. Last ate yesterday at 3pm, some coffee. Has been taking all of his medications. Similar episode 6 months ago--but also had vomiting, diarrhea and headache at that time. 56yo PMHx DM on metformin and glipizine (not on insulin) presenting after episode of LOC at 8:30AM and chest pain. Arguing with wife at home, felt nonradiating anterior chest pain, episode of LOC lasting 1-3 minutes. Family grabbed patient as they noticed that he was going to faint, patient did not hit his head or any other part of his body. Reports someone checked his blood sugar, it was elevated. Says foam was coming out of his mouth. Endorses chills secondary to cold weather. No night sweats, fevers, vomiting, diarrhea or headache. Endorses headache localized to the entire head, worse when he bends down. No rash or neck stiffness. Endorsing photophobia. No nausea or vomiting or abdominal pain. Current dizziness and feeling like the room is spinning--has not had this before. Dizziness exacerbated by standing up. Did not eat anything this morning. Last ate yesterday at 3pm, some coffee. Has been taking all of his medications. Similar episode 6 months ago--but also had vomiting, diarrhea and headache at that time. 56yo PMHx DM on metformin and glipizine (not on insulin) presenting after episode of LOC at 8:30AM and chest pain. Arguing with wife at home, felt nonradiating anterior chest pain, episode of LOC lasting 1-3 minutes. Family grabbed patient as they noticed that he was going to faint, patient did not hit his head or any other part of his body. Reports someone checked his blood sugar, it was elevated. Says foam was coming out of his mouth. Endorses chills secondary to cold weather. No night sweats, fevers, vomiting, diarrhea or headache. Endorses headache localized to the entire head, worse when he bends down. No rash or neck stiffness. Endorsing photophobia. No nausea or vomiting or abdominal pain. Current dizziness and feeling like the room is spinning--has not had this before. Dizziness exacerbated by standing up. Did not eat anything this morning. Last ate yesterday at 3pm, some coffee. Has been taking all of his medications. Similar episode 6 months ago--but also had vomiting, diarrhea and headache at that time. No alcohol use.

## 2021-01-31 NOTE — ED PROVIDER NOTE - NS ED ROS FT
Gen: No fever, night sweats. +Chills, +appetite  Eyes: No changes in vision   ENT: No congestion, sore throat  Resp: No cough or trouble breathing  Cardiovascular: +chest pain. Palpitation  Gastroenteric: No nausea, vomiting, diarrhea or constipation  :  No change in urine output, dysuria or hematuria   Neuro: No headache; no abnormal movements

## 2021-01-31 NOTE — ED ADULT NURSE REASSESSMENT NOTE - NS ED NURSE REASSESS COMMENT FT1
pt verbalized understanding of DC instructions. pt a/ox4 and ambulatory with steady gait. pt left ED in stable condition.

## 2021-01-31 NOTE — ED ADULT NURSE NOTE - NSIMPLEMENTINTERV_GEN_ALL_ED
Implemented All Universal Safety Interventions:  [place] to call system. Call bell, personal items and telephone within reach. Instruct patient to call for assistance. Room bathroom lighting operational. Non-slip footwear when patient is off stretcher. Physically safe environment: no spills, clutter or unnecessary equipment. Stretcher in lowest position, wheels locked, appropriate side rails in place.

## 2021-01-31 NOTE — ED PROVIDER NOTE - CLINICAL SUMMARY MEDICAL DECISION MAKING FREE TEXT BOX
56yo PMHx DM on "a lot of medications" (not insulin) presenting after episode of LOC. Arguing with wife at home, felt chest pain, episode of LOC lasting 1-3 minutes. No head trauma. 54yo PMHx DM on "a lot of medications" (not insulin) presenting after episode of LOC. Arguing with wife at home, felt chest pain, episode of LOC lasting 1-3 minutes. No head trauma. C/f stroke vs ACS vs BPPV. Will obtain basic blood work, CT head, ECG, give IVF, meclizine and reevaluate. 54yo PMHx DM and HTN presenting after episode of LOC and chest pain. No head trauma. C/f stroke vs ACS vs BPPV. Will obtain basic blood work, CT head, ECG, give IVF, meclizine, tylenol and reevaluate.

## 2021-05-25 ENCOUNTER — NON-APPOINTMENT (OUTPATIENT)
Age: 56
End: 2021-05-25

## 2021-05-25 ENCOUNTER — APPOINTMENT (OUTPATIENT)
Dept: OPHTHALMOLOGY | Facility: CLINIC | Age: 56
End: 2021-05-25
Payer: MEDICAID

## 2021-05-25 PROCEDURE — 92015 DETERMINE REFRACTIVE STATE: CPT | Mod: NC

## 2021-05-25 PROCEDURE — 92014 COMPRE OPH EXAM EST PT 1/>: CPT

## 2021-05-25 PROCEDURE — 92134 CPTRZ OPH DX IMG PST SGM RTA: CPT

## 2022-11-02 ENCOUNTER — NON-APPOINTMENT (OUTPATIENT)
Age: 57
End: 2022-11-02

## 2022-11-02 ENCOUNTER — APPOINTMENT (OUTPATIENT)
Dept: OPHTHALMOLOGY | Facility: CLINIC | Age: 57
End: 2022-11-02

## 2022-11-02 PROCEDURE — 92202 OPSCPY EXTND ON/MAC DRAW: CPT

## 2022-11-02 PROCEDURE — 92014 COMPRE OPH EXAM EST PT 1/>: CPT

## 2023-02-23 NOTE — ED ADULT NURSE NOTE - IS THE PATIENT ABLE TO BE SCREENED?
You can access the FollowMyHealth Patient Portal offered by [facility] by registering at the following website: [email or web address]. By joining [employer]’s FollowMyHealth portal, you will also be able to view your health information using other applications (apps) compatible with our system. Yes

## 2024-03-19 ENCOUNTER — APPOINTMENT (OUTPATIENT)
Dept: OPHTHALMOLOGY | Facility: CLINIC | Age: 59
End: 2024-03-19
Payer: COMMERCIAL

## 2024-03-19 ENCOUNTER — NON-APPOINTMENT (OUTPATIENT)
Age: 59
End: 2024-03-19

## 2024-03-19 PROCEDURE — 92202 OPSCPY EXTND ON/MAC DRAW: CPT

## 2024-03-19 PROCEDURE — 92014 COMPRE OPH EXAM EST PT 1/>: CPT

## 2025-04-01 NOTE — ED PROVIDER NOTE - PATIENT PORTAL LINK FT
Given patient had poor prep in past, we should plan 2 days prep  
You can access the FollowMyHealth Patient Portal offered by [facility] by registering at the following website: [email or web address]. By joining [employer]’s FollowMyHealth portal, you will also be able to view your health information using other applications (apps) compatible with our system.